# Patient Record
Sex: MALE | Race: WHITE | HISPANIC OR LATINO
[De-identification: names, ages, dates, MRNs, and addresses within clinical notes are randomized per-mention and may not be internally consistent; named-entity substitution may affect disease eponyms.]

---

## 2023-03-23 ENCOUNTER — APPOINTMENT (OUTPATIENT)
Dept: PAIN MANAGEMENT | Facility: CLINIC | Age: 53
End: 2023-03-23
Payer: COMMERCIAL

## 2023-03-23 VITALS — BODY MASS INDEX: 31.5 KG/M2 | WEIGHT: 220 LBS | HEIGHT: 70 IN

## 2023-03-23 DIAGNOSIS — Z78.9 OTHER SPECIFIED HEALTH STATUS: ICD-10-CM

## 2023-03-23 PROBLEM — Z00.00 ENCOUNTER FOR PREVENTIVE HEALTH EXAMINATION: Status: ACTIVE | Noted: 2023-03-23

## 2023-03-23 PROCEDURE — 27096 INJECT SACROILIAC JOINT: CPT | Mod: LT

## 2023-03-23 PROCEDURE — 99204 OFFICE O/P NEW MOD 45 MIN: CPT | Mod: 25

## 2023-03-23 RX ORDER — METHOCARBAMOL 750 MG/1
750 TABLET, FILM COATED ORAL 3 TIMES DAILY
Qty: 90 | Refills: 0 | Status: ACTIVE | COMMUNITY
Start: 2023-03-23 | End: 1900-01-01

## 2023-03-23 RX ORDER — TRAMADOL HYDROCHLORIDE 50 MG/1
50 TABLET, COATED ORAL
Qty: 42 | Refills: 0 | Status: ACTIVE | COMMUNITY
Start: 2023-03-23 | End: 1900-01-01

## 2023-03-23 RX ORDER — UBROGEPANT 100 MG/1
100 TABLET ORAL
Refills: 0 | Status: ACTIVE | COMMUNITY

## 2023-03-23 NOTE — PHYSICAL EXAM
[de-identified] : Lumbar Spine Exam:\par \par Inspection:\par erythema (-)\par ecchymosis (-)\par \par Palpation:\par Midline lumbar tenderness:             (-)\par paraspinal tenderness:                  L (-) ; R (-)\par SI joint tenderness:                        L (-) ; R (-)\par GTB tenderness:                            L (-);  R (-)\par \par ROM:  \par Full ROM\par \par Strength:\par 5/5 throughout all muscle groups of the lower extremity \par \par Special Tests:\par SLR:                           R (-) ; L (-)\par Facet loading:            R (-) ; L (-)\par CORINE test:               R (+) ; L (+)\par Gaenslen's:               R (-) ; L (-)\par compression test:               R (+) ; L (+)\par \par Neurologic:\par Reflexes normal and symmetric \par \par Gait:\par non- antalgic gait\par \par

## 2023-03-23 NOTE — HISTORY OF PRESENT ILLNESS
[FreeTextEntry1] : HISTORY OF PRESENT ILLNESS: Mr. Hoffman is a 52 year old male complaining of lower back pain. \par \par He states the pain is in the bilateral lower lumbar spine and radiates into the buttocks area and into the groin. He states the pain is worse with sitting or during any rotational movements. He states the pain is worse when standing for extended periods of time. He is currently wearing a back brace to help ease the pain. He rates the pain at a 8/10 on the pain scale. \par \par Of note, he recently started taking a Medrol dos corinna. \par \par The pain started after no inciting event.  The patient has had this pain for 2 days, with pain worsening over the last day.  Patient describes the pain as moderate to severe.  During the last month the pain has been less than 30% of the time with symptoms worsening morning. Pain described as shooting, sharp, cramping, throbbing. Pain is increased with lying down, walking, exercising, relaxing, coughing.  Pain is not changed with standing, sitting, bowel movements.  Bowel or bladder habits have not changed.\par  \par ACTIVITIES: Patient could walk some blocks before the pain starts.  Patient can sit 30 minutes before pain starts.  Patient can stand 15 minutes before pain starts.  The patient often lies down because of pain.  Patient uses no assistive walking device at this time.  Patient has difficulty going to work, performing household chores, doing outside work or shopping at this time.\par \par PRIOR PAIN TREATMENTS: No prior pain treatments noted. \par \par Prior Pain Medications: None mentioned. \par \par

## 2023-03-23 NOTE — DATA REVIEWED
[FreeTextEntry1] : MRI of the lumbar spine showed lower thoracic, diffuse degenerative disc change. disc protrusions. Lower lumbar degenerative facet disease. Right posterolateral T12-L1 disc protrusion with minimal right anterior thecal sac effacement. Left L5-S1 foraminal stenosis which abuts the left L5 nerve root. No significant spinal canal stenosis.

## 2023-03-23 NOTE — ASSESSMENT
[FreeTextEntry1] : A discussion regarding available pain management treatment options occurred with the patient.  These included interventional, rehabilitative, pharmacological, and alternative modalities. We will proceed with the following:\par \par Interventional treatment options:\par -Bilateral sacroiliac joint injection\par Treatment options were discussed with the patient. The patient has been having persistent pain in the bilateral sacroiliac joint with minimal improvement with conservative therapies. The patient was given the option to proceed with a bilateral sacroiliac joint injection to try to get some pain relief.  The patient understands that the injections are meant to offer pain relief but do not always give pain relief, and thus it is partially diagnostic. If this is the case, this can add to our clinical picture and we can reconsider our options at that point. \par \par The risks and benefits were discussed which included bleeding, infection, nerve injury, no pain relief or worse, increased pain. All questions were answered and the patient will schedule for the injection on the next available date.\par \par Medication based treatment options:\par - patient is already taking medrol dos corinna. advised to continue. \par - start Methocarbamol 750 to be taken TID.\par - start tramadol 50 mg to be taken as needed for when pain is severe. \par \par Imaging studies:\par Ordered a MRI of the lumbar spine to delineate any new spinal pathology such a disc displacement or stenosis. \par - patient with follow up 2 weeks after injection. \par \par I, Judy Choi, attest that this documentation has been prepared under the direction and in the presence of Provider Guerrero Del Cid DO\par \par The documentation recorded by the scribe, in my presence, accurately reflects the service I personally performed, and the decisions made by me with my edits as appropriate.\par \par Best Regards, \par Guerrero Del Cid D.O.

## 2023-03-23 NOTE — PROCEDURE
[FreeTextEntry3] : Date of Service: 03/23/2023 \par \par Account: [id number]\par \par Patient: SADIQ BOWLES \par \par YOB: 1970\par \par Age: 52 year\par Surgeon:      Guerrero Del Cid DO\par \par Pre - Operative Diagnosis:       Left sacroiliitis      \par \par Post - Operative Diagnosis:     Same            \par \par Procedure:             left Sacroiliac arthrogram and joint injection under fluoroscopic guidance\par \par This procedure was carried out using fluoroscopic guidance.  The risks and benefits of the procedure were discussed extensively with the patient.  The consent of the patient was obtained and the following procedure was performed. The patient was placed in the prone position on the fluoroscopy table and the area was prepped and draped in a sterile fashion.  A timeout was performed with all essential staff present and the site and side were verified.\par \par The patient was placed in the prone position.  The patient's back was prepped and draped in a sterile fashion.  The fluoroscopic image intensifier was then positioned jordi maximize visualization of the joint.  This was achieved with slight cephalad and medial angulation. \par \par - The area corresponding to the left inferior SIJ space was then identified and marked.  A 25 gauge 3.5 inch spinal needle was then inserted and directed into the left sacroiliac joint space using fluoroscopic guidance.  After negative aspiration for heme and CSF, 2 cc of omnipaque was injected and appeared to fill the joint space.  An injectate of 2.5 cc 0.25% Marcaine plus 10 mg of Dexamethasone was then injected into the left sacroiliac joint space.\par \par The needle was subsequently removed.  Vital signs remained normal.  Pulse oximeter was used throughout the procedure and the patient's pulse and oxygen saturation remained within normal limits.  The patient tolerated the procedure well.  There were no complications.  The patient was instructed to apply ice over the injection sites for twenty minutes every two hours for the next 24 to 48 hours.\par \par Disposition:\par \par       1. The patient was advised to F/U in 1-2 weeks to assess the response to the injection.\par       2. The patient was also instructed to contact me immediately if there were any concerns related to the procedure performed.\par

## 2023-04-07 ENCOUNTER — APPOINTMENT (OUTPATIENT)
Dept: PAIN MANAGEMENT | Facility: CLINIC | Age: 53
End: 2023-04-07
Payer: COMMERCIAL

## 2023-04-07 DIAGNOSIS — M54.50 LOW BACK PAIN, UNSPECIFIED: ICD-10-CM

## 2023-04-07 PROCEDURE — 99214 OFFICE O/P EST MOD 30 MIN: CPT

## 2023-04-08 PROBLEM — M54.50 LUMBAR PAIN: Status: ACTIVE | Noted: 2023-04-08

## 2023-04-08 RX ORDER — MELOXICAM 15 MG/1
15 TABLET ORAL DAILY
Qty: 14 | Refills: 0 | Status: ACTIVE | COMMUNITY
Start: 2023-04-08 | End: 1900-01-01

## 2023-04-08 NOTE — DISCUSSION/SUMMARY
[de-identified] : A discussion regarding available pain management treatment options occurred with the patient.  These included interventional, rehabilitative, pharmacological, and alternative modalities. We will proceed with the following:\par \par Interventional treatment options:\par had 100% pain relief with b/l SI joint injections\par \par Rehabilitative options:\par -printed out physician directed exercises focusing on SI joint pain\par \par Medication based treatment options:\par sent mobic for when pain occurs\par \par f/u prn\par

## 2023-04-08 NOTE — HISTORY OF PRESENT ILLNESS
[FreeTextEntry1] : HISTORY OF PRESENT ILLNESS: Mr. Hoffman is a 52 year old male complaining of lower back pain. \par \par He states the pain is in the bilateral lower lumbar spine and radiates into the buttocks area and into the groin. He states the pain is worse with sitting or during any rotational movements. He states the pain is worse when standing for extended periods of time. He is currently wearing a back brace to help ease the pain. He rates the pain at a 8/10 on the pain scale. \par \par Of note, he recently started taking a Medrol dos corinna. \par \par The pain started after no inciting event.  The patient has had this pain for 2 days, with pain worsening over the last day.  Patient describes the pain as moderate to severe.  During the last month the pain has been less than 30% of the time with symptoms worsening morning. Pain described as shooting, sharp, cramping, throbbing. Pain is increased with lying down, walking, exercising, relaxing, coughing.  Pain is not changed with standing, sitting, bowel movements.  Bowel or bladder habits have not changed.\par  \par ACTIVITIES: Patient could walk some blocks before the pain starts.  Patient can sit 30 minutes before pain starts.  Patient can stand 15 minutes before pain starts.  The patient often lies down because of pain.  Patient uses no assistive walking device at this time.  Patient has difficulty going to work, performing household chores, doing outside work or shopping at this time.\par \par PRIOR PAIN TREATMENTS: No prior pain treatments noted. \par \par Prior Pain Medications: None mentioned. \par \par 4/7/23\par Patient has 100% pain relief to date after b/l SI joint injections under fluoroscopy. Pt denies bowel/bladder incontinence, weakness, falls, unsteadiness. He sometimes feels a sharp pain in both buttocks with certain transitional movements but significantly improved. \par \par \par

## 2023-04-08 NOTE — PHYSICAL EXAM
[de-identified] : Lumbar Spine Exam:\par Palpation:\par Midline lumbar tenderness:             (-)\par paraspinal tenderness:                  L (+) ; R (+)\par SI joint tenderness:                        L (+) ; R (+)\par \par \par Gait:\par non- antalgic gait\par \par

## 2023-07-26 ENCOUNTER — APPOINTMENT (OUTPATIENT)
Dept: PAIN MANAGEMENT | Facility: CLINIC | Age: 53
End: 2023-07-26
Payer: COMMERCIAL

## 2023-07-26 VITALS — BODY MASS INDEX: 31.5 KG/M2 | HEIGHT: 70 IN | WEIGHT: 220 LBS

## 2023-07-26 PROCEDURE — 93770 DETERMINATION VENOUS PRESS: CPT

## 2023-07-26 PROCEDURE — 99214 OFFICE O/P EST MOD 30 MIN: CPT | Mod: 25

## 2023-07-26 PROCEDURE — 94761 N-INVAS EAR/PLS OXIMETRY MLT: CPT

## 2023-07-26 PROCEDURE — 27096 INJECT SACROILIAC JOINT: CPT | Mod: 50

## 2023-07-26 NOTE — PROCEDURE
[FreeTextEntry3] : Date of Service: 07/26/2023 \par \par Account: [id number]\par \par Patient: SADIQ BOWLES \par \par YOB: 1970\par \par Age: 52 year\par Surgeon:      Guerrero Del Cid DO\par \par Pre - Operative Diagnosis:       Bilateral sacroiliitis      \par \par Post - Operative Diagnosis:     Same            \par \par Procedure:             Bilateral Sacroiliac arthrogram and joint injection under fluoroscopic guidance\par \par This procedure was carried out using fluoroscopic guidance.  The risks and benefits of the procedure were discussed extensively with the patient.  The consent of the patient was obtained and the following procedure was performed. The patient was placed in the prone position on the fluoroscopy table and the area was prepped and draped in a sterile fashion.  A timeout was performed with all essential staff present and the site and side were verified.\par \par   The fluoroscopic image intensifier was then positioned to maximize visualization of the joint.  This was achieved with slight cephalad and medial angulation. \par \par - The area corresponding to the left inferior SIJ space was then identified and marked.  A 25 gauge 3.5 inch spinal needle was then inserted and directed into the left sacroiliac joint space using fluoroscopic guidance.  After negative aspiration for heme and CSF, 2 cc of omnipaque was injected and appeared to fill the joint space.  An injectate of 2.5 cc 0.25% Marcaine plus 10 mg of Dexamethasone was then injected into the left sacroiliac joint space.\par \par - The area corresponding to the right inferior SIJ space was then identified and marked.  A 25 gauge 3.5 inch spinal needle was then inserted and directed into the right sacroiliac joint space using fluoroscopic guidance.  After negative aspiration for heme and CSF, 2 cc of Omnipaque was injected and appeared to fill the joint space.  An injectate of 2.5 cc 0.25% Marcaine plus 10 mg of Dexamethasone was then injected into the right sacroiliac joint space.\par \par The needle was subsequently removed.  Vital signs remained normal.  Pulse oximeter was used throughout the procedure and the patient's pulse and oxygen saturation remained within normal limits.  The patient tolerated the procedure well.  There were no complications.  The patient was instructed to apply ice over the injection sites for twenty minutes every two hours for the next 24 to 48 hours.\par \par Disposition:\par \par       1. The patient was advised to F/U in 1-2 weeks to assess the response to the injection.\par       2. The patient was also instructed to contact me immediately if there were any concerns related to the procedure performed.\par

## 2023-07-27 NOTE — ASSESSMENT
[FreeTextEntry1] : A discussion regarding available pain management treatment options occurred with the patient.  These included interventional, rehabilitative, pharmacological, and alternative modalities. We will proceed with the following:\par \par Interventional treatment options:\par -Bilateral sacroiliac joint injection\par Treatment options were discussed with the patient. The patient has been having persistent pain in the bilateral sacroiliac joint with minimal improvement with conservative therapies. The patient was given the option to proceed with a bilateral sacroiliac joint injection to try to get some pain relief.  The patient understands that the injections are meant to offer pain relief but do not always give pain relief, and thus it is partially diagnostic. If this is the case, this can add to our clinical picture and we can reconsider our options at that point. \par \par The risks and benefits were discussed which included bleeding, infection, nerve injury, no pain relief or worse, increased pain. All questions were answered and the patient will schedule for the injection on the next available date.\par \par Medication based treatment options:\par c/w nsaids\par

## 2023-07-27 NOTE — HISTORY OF PRESENT ILLNESS
[FreeTextEntry1] : 7/27/23\par Patient presents with 4 months of 90% pain relief after a b/l SI joint injection under fluoro. Pt denies bowel/bladder incontinence, weakness, falls, unsteadiness.\par Pain is starting to come back which is currently a 7/10 bilaterally at its worst and is traveling to [place] in a few weeks. Pain is dull, achy in nature worse with transitional movements without radicular feature, burning, tingling. He does not get relief with NSAIDs. \par

## 2023-07-27 NOTE — PHYSICAL EXAM
[de-identified] : Lumbar Spine Exam:\par \par \par Palpation:\par Midline lumbar tenderness:             (-)\par paraspinal tenderness:                  L (-) ; R (-)\par SI joint tenderness:                        L (-) ; R (-)\par GTB tenderness:                            L (-);  R (-)\par \par ROM:  \par Full ROM\par \par Special Tests:\par SLR:                           R (-) ; L (-)\par Facet loading:            R (-) ; L (-)\par CORINE test:               R (+) ; L (+)\par Gaenslen's:               R (+) ; L (+)\par compression test:               R (+) ; L (+)\par \par \par \par

## 2023-08-04 ENCOUNTER — APPOINTMENT (OUTPATIENT)
Dept: PAIN MANAGEMENT | Facility: CLINIC | Age: 53
End: 2023-08-04

## 2023-08-11 ENCOUNTER — APPOINTMENT (OUTPATIENT)
Dept: PAIN MANAGEMENT | Facility: CLINIC | Age: 53
End: 2023-08-11

## 2024-01-10 ENCOUNTER — APPOINTMENT (OUTPATIENT)
Dept: PAIN MANAGEMENT | Facility: CLINIC | Age: 54
End: 2024-01-10
Payer: COMMERCIAL

## 2024-01-10 PROCEDURE — 27096 INJECT SACROILIAC JOINT: CPT | Mod: 50

## 2024-01-10 NOTE — PROCEDURE
[FreeTextEntry3] : Date of Service: 01/10/2024   Account: [id number]  Patient: SADIQ BOWLES   YOB: 1970  Age: 53 year Surgeon:      Guerrero Del Cid DO  Pre - Operative Diagnosis:       Bilateral sacroiliitis        Post - Operative Diagnosis:     Same              Procedure:             Bilateral Sacroiliac arthrogram and joint injection under fluoroscopic guidance  This procedure was carried out using fluoroscopic guidance.  The risks and benefits of the procedure were discussed extensively with the patient.  The consent of the patient was obtained and the following procedure was performed. The patient was placed in the prone position on the fluoroscopy table and the area was prepped and draped in a sterile fashion.  A timeout was performed with all essential staff present and the site and side were verified.  The patient was placed in the prone position.  The patient's back was prepped and draped in a sterile fashion.  The fluoroscopic image intensifier was then positioned jordi maximize visualization of the joint.  This was achieved with slight cephalad and medial angulation.   - The area corresponding to the left inferior SIJ space was then identified and marked.  A 25 gauge 3.5 inch spinal needle was then inserted and directed into the left sacroiliac joint space using fluoroscopic guidance.  After negative aspiration for heme and CSF, 2 cc of omnipaque was injected and appeared to fill the joint space.  An injectate of 2.5 cc 0.25% Marcaine plus 10 mg of Dexamethasone was then injected into the left sacroiliac joint space.  - The area corresponding to the right inferior SIJ space was then identified and marked.  A 25 gauge 3.5 inch spinal needle was then inserted and directed into the right sacroiliac joint space using fluoroscopic guidance.  After negative aspiration for heme and CSF, 2 cc of Omnipaque was injected and appeared to fill the joint space.  An injectate of 2.5 cc 0.25% Marcaine plus 10 mg of Dexamethasone was then injected into the right sacroiliac joint space.  The needle was subsequently removed.  Vital signs remained normal.  Pulse oximeter was used throughout the procedure and the patient's pulse and oxygen saturation remained within normal limits.  The patient tolerated the procedure well.  There were no complications.  The patient was instructed to apply ice over the injection sites for twenty minutes every two hours for the next 24 to 48 hours.  Disposition:        1. The patient was advised to F/U in 1-2 weeks to assess the response to the injection.       2. The patient was also instructed to contact me immediately if there were any concerns related to the procedure performed.

## 2024-01-25 ENCOUNTER — APPOINTMENT (OUTPATIENT)
Dept: PAIN MANAGEMENT | Facility: CLINIC | Age: 54
End: 2024-01-25
Payer: COMMERCIAL

## 2024-01-25 PROCEDURE — 99213 OFFICE O/P EST LOW 20 MIN: CPT | Mod: 95

## 2024-01-25 NOTE — DISCUSSION/SUMMARY
[de-identified] : A discussion regarding available pain management treatment options occurred with the patient. These included interventional, rehabilitative, pharmacological, and alternative modalities. We will proceed with the following:   f/u prn

## 2024-01-25 NOTE — HISTORY OF PRESENT ILLNESS
[FreeTextEntry1] : 7/27/23 Patient presents with 4 months of 90% pain relief after a b/l SI joint injection under fluoro. Pt denies bowel/bladder incontinence, weakness, falls, unsteadiness. Pain is starting to come back which is currently a 7/10 bilaterally at its worst and is traveling to [place] in a few weeks. Pain is dull, achy in nature worse with transitional movements without radicular feature, burning, tingling. He does not get relief with NSAIDs.    TODAY revisit encounter 01-: Patient presents to the office today via telehealth for a follow up visit.    He is status post a bilateral Sacroiliac arthrogram and joint injection under fluoroscopic guidance on01/10/2024 which afforded him about 100% sustained relief to date. Overall, there is about 100% reduction in pain. The patient denies any adverse side effects due to the injection. He reports improvement in his symptoms and his functional status overall. Patient denies any bowel or bladder dysfunction, incontinence, or saddle anesthesia.

## 2024-03-12 ENCOUNTER — OUTPATIENT (OUTPATIENT)
Dept: OUTPATIENT SERVICES | Facility: HOSPITAL | Age: 54
LOS: 1 days | End: 2024-03-12
Payer: COMMERCIAL

## 2024-03-12 DIAGNOSIS — R07.9 CHEST PAIN, UNSPECIFIED: ICD-10-CM

## 2024-03-12 DIAGNOSIS — Z00.8 ENCOUNTER FOR OTHER GENERAL EXAMINATION: ICD-10-CM

## 2024-03-12 PROCEDURE — 75571 CT HRT W/O DYE W/CA TEST: CPT

## 2024-03-12 PROCEDURE — 75571 CT HRT W/O DYE W/CA TEST: CPT | Mod: 26

## 2024-03-13 DIAGNOSIS — R07.9 CHEST PAIN, UNSPECIFIED: ICD-10-CM

## 2024-03-26 ENCOUNTER — OUTPATIENT (OUTPATIENT)
Dept: OUTPATIENT SERVICES | Facility: HOSPITAL | Age: 54
LOS: 1 days | End: 2024-03-26
Payer: COMMERCIAL

## 2024-03-26 DIAGNOSIS — I25.10 ATHEROSCLEROTIC HEART DISEASE OF NATIVE CORONARY ARTERY WITHOUT ANGINA PECTORIS: ICD-10-CM

## 2024-03-26 DIAGNOSIS — Z00.8 ENCOUNTER FOR OTHER GENERAL EXAMINATION: ICD-10-CM

## 2024-03-26 PROCEDURE — 75574 CT ANGIO HRT W/3D IMAGE: CPT | Mod: 26

## 2024-03-26 PROCEDURE — 75574 CT ANGIO HRT W/3D IMAGE: CPT

## 2024-03-27 DIAGNOSIS — I25.10 ATHEROSCLEROTIC HEART DISEASE OF NATIVE CORONARY ARTERY WITHOUT ANGINA PECTORIS: ICD-10-CM

## 2024-04-17 ENCOUNTER — APPOINTMENT (OUTPATIENT)
Dept: PAIN MANAGEMENT | Facility: CLINIC | Age: 54
End: 2024-04-17
Payer: COMMERCIAL

## 2024-04-17 DIAGNOSIS — M46.1 SACROILIITIS, NOT ELSEWHERE CLASSIFIED: ICD-10-CM

## 2024-04-17 PROCEDURE — 27096 INJECT SACROILIAC JOINT: CPT | Mod: RT

## 2024-04-17 NOTE — PROCEDURE
[FreeTextEntry3] : Date of Service: 04/17/2024   Account: [id number]  Patient: SADIQ BOWLES   YOB: 1970  Age: 53 year Surgeon:      Guerrero Del Cid DO  Pre - Operative Diagnosis:       right sacroiliitis        Post - Operative Diagnosis:     Same              Procedure:             right Sacroiliac arthrogram and joint injection under fluoroscopic guidance  This procedure was carried out using fluoroscopic guidance.  The risks and benefits of the procedure were discussed extensively with the patient.  The consent of the patient was obtained and the following procedure was performed. The patient was placed in the prone position on the fluoroscopy table and the area was prepped and draped in a sterile fashion.  A timeout was performed with all essential staff present and the site and side were verified.  The patient was placed in the prone position.  The patient's back was prepped and draped in a sterile fashion.  The fluoroscopic image intensifier was then positioned jordi maximize visualization of the joint.  This was achieved with slight cephalad and medial angulation.    - The area corresponding to the right inferior SIJ space was then identified and marked.  A 25 gauge 3.5 inch spinal needle was then inserted and directed into the right sacroiliac joint space using fluoroscopic guidance.  After negative aspiration for heme and CSF, 2 cc of Omnipaque was injected and appeared to fill the joint space.  An injectate of 2.5 cc 0.25% Marcaine plus 10 mg of Dexamethasone was then injected into the right sacroiliac joint space.  The needle was subsequently removed.  Vital signs remained normal.  Pulse oximeter was used throughout the procedure and the patient's pulse and oxygen saturation remained within normal limits.  The patient tolerated the procedure well.  There were no complications.  The patient was instructed to apply ice over the injection sites for twenty minutes every two hours for the next 24 to 48 hours.  Disposition:        1. The patient was advised to F/U in 1-2 weeks to assess the response to the injection.       2. The patient was also instructed to contact me immediately if there were any concerns related to the procedure performed.

## 2024-05-02 ENCOUNTER — APPOINTMENT (OUTPATIENT)
Dept: PAIN MANAGEMENT | Facility: CLINIC | Age: 54
End: 2024-05-02
Payer: COMMERCIAL

## 2024-05-02 DIAGNOSIS — M54.16 RADICULOPATHY, LUMBAR REGION: ICD-10-CM

## 2024-05-02 DIAGNOSIS — M46.1 SACROILIITIS, NOT ELSEWHERE CLASSIFIED: ICD-10-CM

## 2024-05-02 PROCEDURE — 99213 OFFICE O/P EST LOW 20 MIN: CPT | Mod: 95

## 2024-05-06 PROBLEM — M54.16 LUMBAR RADICULOPATHY, ACUTE: Status: ACTIVE | Noted: 2023-03-23

## 2024-05-06 PROBLEM — M46.1 BILATERAL SACROILIITIS: Status: ACTIVE | Noted: 2023-03-23

## 2024-05-06 NOTE — HISTORY OF PRESENT ILLNESS
[FreeTextEntry1] : 7/27/23 Patient presents with 4 months of 90% pain relief after a b/l SI joint injection under fluoro. Pt denies bowel/bladder incontinence, weakness, falls, unsteadiness. Pain is starting to come back which is currently a 7/10 bilaterally at its worst and is traveling to [place] in a few weeks. Pain is dull, achy in nature worse with transitional movements without radicular feature, burning, tingling. He does not get relief with NSAIDs.    TODAY 05-: Patient presents via telehealth for a follow up visit.   He is status post a right Sacroiliac arthrogram and joint injection under fluoroscopic guidance on 04/17/2024 which afforded him about 75% sustained relief to date. Overall, there is about 75% reduction in pain. The patient denies any adverse side effects due to the injection. He reports improvement in his symptoms and his functional status overall. He continues with pain referring from the groin region down to the knee and does not pass the knee. He notes the main pain localizes above the knee. He denies any numbness or tingling. Patient denies any bowel or bladder dysfunction, incontinence, or saddle anesthesia.

## 2024-05-06 NOTE — DISCUSSION/SUMMARY
[de-identified] : A discussion regarding available pain management treatment options occurred with the patient. These included interventional, rehabilitative, pharmacological, and alternative modalities. We will proceed with the following:   f/u prn     I, Yasmany Lara, attest that this documentation has been prepared under the direction and in the presence of Provider Guerrero Del Cid, DO The documentation recorded by the scribe, in my presence, accurately reflects the service I personally performed, and the decisions made by me with my edits as appropriate.   Best Regards, Guerrero Del Cid D.O.

## 2024-07-10 ENCOUNTER — APPOINTMENT (OUTPATIENT)
Dept: PAIN MANAGEMENT | Facility: CLINIC | Age: 54
End: 2024-07-10
Payer: COMMERCIAL

## 2024-07-10 DIAGNOSIS — M46.1 SACROILIITIS, NOT ELSEWHERE CLASSIFIED: ICD-10-CM

## 2024-07-10 PROCEDURE — 27096 INJECT SACROILIAC JOINT: CPT | Mod: LT

## 2024-11-29 ENCOUNTER — NON-APPOINTMENT (OUTPATIENT)
Age: 54
End: 2024-11-29

## 2024-12-04 ENCOUNTER — APPOINTMENT (OUTPATIENT)
Dept: PAIN MANAGEMENT | Facility: CLINIC | Age: 54
End: 2024-12-04

## 2024-12-04 DIAGNOSIS — M46.1 SACROILIITIS, NOT ELSEWHERE CLASSIFIED: ICD-10-CM

## 2024-12-04 PROCEDURE — 27096 INJECT SACROILIAC JOINT: CPT | Mod: 50

## 2024-12-19 ENCOUNTER — APPOINTMENT (OUTPATIENT)
Dept: PAIN MANAGEMENT | Facility: CLINIC | Age: 54
End: 2024-12-19
Payer: COMMERCIAL

## 2024-12-19 DIAGNOSIS — M46.1 SACROILIITIS, NOT ELSEWHERE CLASSIFIED: ICD-10-CM

## 2024-12-19 PROCEDURE — 99213 OFFICE O/P EST LOW 20 MIN: CPT | Mod: 95

## 2025-03-12 ENCOUNTER — APPOINTMENT (OUTPATIENT)
Dept: PAIN MANAGEMENT | Facility: CLINIC | Age: 55
End: 2025-03-12
Payer: COMMERCIAL

## 2025-03-12 DIAGNOSIS — M46.1 SACROILIITIS, NOT ELSEWHERE CLASSIFIED: ICD-10-CM

## 2025-03-12 PROCEDURE — 27096 INJECT SACROILIAC JOINT: CPT | Mod: 50

## 2025-04-03 ENCOUNTER — APPOINTMENT (OUTPATIENT)
Dept: PAIN MANAGEMENT | Facility: CLINIC | Age: 55
End: 2025-04-03

## 2025-04-03 DIAGNOSIS — M46.1 SACROILIITIS, NOT ELSEWHERE CLASSIFIED: ICD-10-CM

## 2025-04-03 PROCEDURE — 99213 OFFICE O/P EST LOW 20 MIN: CPT | Mod: 95

## 2025-07-02 ENCOUNTER — APPOINTMENT (OUTPATIENT)
Dept: PAIN MANAGEMENT | Facility: CLINIC | Age: 55
End: 2025-07-02
Payer: COMMERCIAL

## 2025-07-02 PROCEDURE — 27096 INJECT SACROILIAC JOINT: CPT | Mod: 50

## 2025-07-24 ENCOUNTER — APPOINTMENT (OUTPATIENT)
Dept: PAIN MANAGEMENT | Facility: CLINIC | Age: 55
End: 2025-07-24
Payer: COMMERCIAL

## 2025-07-24 DIAGNOSIS — M46.1 SACROILIITIS, NOT ELSEWHERE CLASSIFIED: ICD-10-CM

## 2025-07-24 PROCEDURE — 99213 OFFICE O/P EST LOW 20 MIN: CPT | Mod: 95
